# Patient Record
Sex: MALE | Race: WHITE | NOT HISPANIC OR LATINO | Employment: UNEMPLOYED | URBAN - NONMETROPOLITAN AREA
[De-identification: names, ages, dates, MRNs, and addresses within clinical notes are randomized per-mention and may not be internally consistent; named-entity substitution may affect disease eponyms.]

---

## 2017-02-22 ENCOUNTER — TRANSCRIBE ORDERS (OUTPATIENT)
Dept: ADMINISTRATIVE | Facility: HOSPITAL | Age: 57
End: 2017-02-22

## 2017-02-22 DIAGNOSIS — R91.8 ABNORMAL CXR WITH MULTIPLE NODULES: Primary | ICD-10-CM

## 2017-02-28 ENCOUNTER — HOSPITAL ENCOUNTER (OUTPATIENT)
Dept: CT IMAGING | Facility: HOSPITAL | Age: 57
Discharge: HOME | End: 2017-02-28
Admitting: GENERAL PRACTICE

## 2017-02-28 DIAGNOSIS — R91.8 ABNORMAL CXR WITH MULTIPLE NODULES: ICD-10-CM

## 2017-02-28 PROCEDURE — 0 IOPAMIDOL 61 % SOLUTION: Performed by: NURSE PRACTITIONER

## 2017-02-28 PROCEDURE — 71270 CT THORAX DX C-/C+: CPT

## 2017-02-28 PROCEDURE — 71270 CT THORAX DX C-/C+: CPT | Performed by: RADIOLOGY

## 2017-02-28 RX ADMIN — IOPAMIDOL 80 ML: 612 INJECTION, SOLUTION INTRAVENOUS at 08:50

## 2017-11-20 RX ORDER — SIMVASTATIN 40 MG
40 TABLET ORAL NIGHTLY
COMMUNITY
End: 2021-04-14

## 2017-11-20 RX ORDER — AMLODIPINE BESYLATE 10 MG/1
10 TABLET ORAL DAILY
COMMUNITY
End: 2021-04-14

## 2017-11-20 RX ORDER — LEVOTHYROXINE SODIUM 0.12 MG/1
125 TABLET ORAL DAILY
COMMUNITY

## 2017-11-20 RX ORDER — OXYCODONE HYDROCHLORIDE 15 MG/1
15 TABLET, FILM COATED, EXTENDED RELEASE ORAL EVERY 12 HOURS SCHEDULED
COMMUNITY

## 2017-11-20 RX ORDER — LISINOPRIL 40 MG/1
40 TABLET ORAL DAILY
COMMUNITY
End: 2021-04-14

## 2017-11-20 RX ORDER — GABAPENTIN 800 MG/1
800 TABLET ORAL 3 TIMES DAILY
COMMUNITY

## 2017-11-20 RX ORDER — TIZANIDINE 4 MG/1
4 TABLET ORAL NIGHTLY PRN
COMMUNITY
End: 2021-04-14

## 2017-11-20 RX ORDER — BUSPIRONE HYDROCHLORIDE 5 MG/1
5 TABLET ORAL 3 TIMES DAILY
COMMUNITY

## 2017-11-20 RX ORDER — LIDOCAINE 50 MG/G
OINTMENT TOPICAL
COMMUNITY
End: 2021-04-14

## 2017-11-20 RX ORDER — METOPROLOL SUCCINATE 25 MG/1
25 TABLET, EXTENDED RELEASE ORAL DAILY
COMMUNITY
End: 2021-04-14

## 2021-04-14 ENCOUNTER — CONSULT (OUTPATIENT)
Dept: GASTROENTEROLOGY | Facility: CLINIC | Age: 61
End: 2021-04-14

## 2021-04-14 VITALS
HEART RATE: 61 BPM | WEIGHT: 203.6 LBS | HEIGHT: 68 IN | TEMPERATURE: 97.2 F | BODY MASS INDEX: 30.86 KG/M2 | SYSTOLIC BLOOD PRESSURE: 144 MMHG | DIASTOLIC BLOOD PRESSURE: 90 MMHG

## 2021-04-14 DIAGNOSIS — R19.5 HEME POSITIVE STOOL: Primary | ICD-10-CM

## 2021-04-14 PROCEDURE — 99203 OFFICE O/P NEW LOW 30 MIN: CPT | Performed by: INTERNAL MEDICINE

## 2021-04-14 RX ORDER — ROSUVASTATIN CALCIUM 10 MG/1
TABLET, COATED ORAL
COMMUNITY
Start: 2021-03-12

## 2021-04-14 RX ORDER — APIXABAN 5 MG/1
TABLET, FILM COATED ORAL
COMMUNITY
Start: 2021-03-12

## 2021-04-14 NOTE — PROGRESS NOTES
"Subjective     Valente Etienne is a 60 y.o. male who presents to the office today as a consultation from Eugene Lopez MD for evaluation of Rectal Bleeding        History of Present Illness:  The patient presents for evaluation of heme positive stool.  He denies BRBPR or melena.  He states his stool is always brown.  No family history of colon cancer.  No constipation or diarrhea.  He generally has a bm once to 3 times a day.  No weight loss.  He takes Eliquis for \"a blood clot in my legs\".  He has never had an MI.  He does have hypothyroidism.  He is tells me that he is reluctant to undergo colonoscopy.  It has been offered to him on multiple occasions but he has declined.      Review of Systems:  Review of Systems   Constitutional: Negative for chills, fatigue and fever.   HENT: Negative for trouble swallowing.    Eyes: Negative.    Respiratory: Negative for chest tightness.    Cardiovascular: Negative for chest pain.   Gastrointestinal: Positive for anal bleeding and blood in stool. Negative for abdominal distention, abdominal pain, constipation, diarrhea, nausea and vomiting.   Endocrine: Negative.    Genitourinary: Negative for difficulty urinating.   Musculoskeletal: Positive for back pain. Negative for neck pain.   Skin: Negative.    Allergic/Immunologic: Negative.    Neurological: Negative for dizziness, light-headedness and headaches.   Hematological: Bruises/bleeds easily.   Psychiatric/Behavioral: Negative.        Past Medical History:  Past Medical History:   Diagnosis Date   • Chronic pain syndrome    • DVT (deep venous thrombosis) (CMS/HCC)    • Hyperlipidemia    • Hypertension    • Hypothyroidism        Past Surgical History:  History reviewed. No pertinent surgical history.    Family History:  Family History   Problem Relation Age of Onset   • Heart disease Mother    • Cancer Father        Social History:  Social History     Socioeconomic History   • Marital status:      Spouse name: Not on " "file   • Number of children: Not on file   • Years of education: Not on file   • Highest education level: Not on file   Tobacco Use   • Smoking status: Current Every Day Smoker     Packs/day: 1.00     Types: Cigarettes   • Smokeless tobacco: Never Used   Substance and Sexual Activity   • Alcohol use: No   • Drug use: No   • Sexual activity: Defer       Current Medication List:    Current Outpatient Medications:   •  busPIRone (BUSPAR) 5 MG tablet, Take 5 mg by mouth 3 (Three) Times a Day., Disp: , Rfl:   •  Eliquis 5 MG tablet tablet, , Disp: , Rfl:   •  gabapentin (NEURONTIN) 800 MG tablet, Take 800 mg by mouth 3 (Three) Times a Day., Disp: , Rfl:   •  levothyroxine (SYNTHROID, LEVOTHROID) 125 MCG tablet, Take 125 mcg by mouth Daily., Disp: , Rfl:   •  oxyCODONE ER (oxyCONTIN) 15 MG tablet extended-release 12 hour, Take 15 mg by mouth Every 12 (Twelve) Hours., Disp: , Rfl:   •  rosuvastatin (CRESTOR) 10 MG tablet, , Disp: , Rfl:     Allergies:   Patient has no known allergies.    Vitals:  /90 (BP Location: Left arm, Patient Position: Sitting, Cuff Size: Adult)   Pulse 61   Temp 97.2 °F (36.2 °C)   Ht 172.7 cm (68\")   Wt 92.4 kg (203 lb 9.6 oz)   BMI 30.96 kg/m²     Physical Exam:  Physical Exam  Constitutional:       Appearance: He is obese.   HENT:      Head: Normocephalic and atraumatic.      Nose: Nose normal. No congestion or rhinorrhea.   Eyes:      General: No scleral icterus.     Extraocular Movements: Extraocular movements intact.      Conjunctiva/sclera: Conjunctivae normal.      Pupils: Pupils are equal, round, and reactive to light.   Cardiovascular:      Rate and Rhythm: Normal rate and regular rhythm.      Pulses: Normal pulses.      Heart sounds: Normal heart sounds.   Pulmonary:      Effort: Pulmonary effort is normal.      Breath sounds: Normal breath sounds.   Abdominal:      General: Abdomen is flat. Bowel sounds are normal. There is no distension.      Palpations: Abdomen is soft. " There is no shifting dullness, fluid wave, hepatomegaly, splenomegaly, mass or pulsatile mass.      Tenderness: There is no abdominal tenderness. There is no guarding or rebound.      Hernia: No hernia is present.   Musculoskeletal:         General: No swelling (swelling L LE > R LE) or tenderness.      Cervical back: Normal range of motion and neck supple.   Skin:     General: Skin is warm and dry.      Coloration: Skin is not jaundiced.   Neurological:      General: No focal deficit present.      Mental Status: He is alert and oriented to person, place, and time.   Psychiatric:         Mood and Affect: Mood normal.         Behavior: Behavior normal.         Results Review:  Lab Results:   No visits with results within 1 Month(s) from this visit.   Latest known visit with results is:   No results found for any previous visit.       Assessment/Plan     Visit Diagnoses:    ICD-10-CM ICD-9-CM   1. Heme positive stool  R19.5 792.1       Plan:  I have had a long discussion with the patient concerning the benefit of colonoscopy in the setting of heme positive stool in respect to detecting colon cancer early and or preventing colon cancer in the future.  I have given him handouts on both colonoscopy preparation and the indications for colonoscopy.  He would like to share this information with his children and then make a decision as to whether or not he will proceed with colonoscopy.  I did explain the risk of not having a colonoscopy performed - primarily a preventable colon cancer.    * Surgery not found *      MEDICATION ISSUES:  Discussed medication options and treatment plan of prescribed medication as well as the risks, benefits, and side effects including potential falls, possible impaired driving and metabolic adversities among others. Patient is agreeable to call the office with any worsening of symptoms or onset of side effects. Patient is agreeable to call 911 or go to the nearest ER should he/she begin having  SI/HI.     MEDS ORDERED DURING VISIT:  No orders of the defined types were placed in this encounter.      No follow-ups on file.             This document has been electronically signed by Alda Muniz MD   April 14, 2021 09:59 EDT        Part of this note may be an electronic transcription/translation of spoken language to printed text using the Dragon Dictation System.

## 2021-10-14 ENCOUNTER — HOSPITAL ENCOUNTER (OUTPATIENT)
Dept: HOSPITAL 79 - EXRD | Age: 61
End: 2021-10-14
Attending: INTERNAL MEDICINE
Payer: MEDICARE

## 2021-10-14 DIAGNOSIS — I10: Primary | ICD-10-CM

## 2023-06-26 PROBLEM — Z86.718 HISTORY OF DVT (DEEP VEIN THROMBOSIS): Status: ACTIVE | Noted: 2023-06-26

## 2023-06-26 PROBLEM — R91.8 MULTIPLE PULMONARY NODULES: Status: ACTIVE | Noted: 2023-06-26

## 2023-06-26 PROBLEM — Z72.0 TOBACCO ABUSE: Status: ACTIVE | Noted: 2023-06-26

## 2023-06-26 PROBLEM — Z79.01 CHRONIC ANTICOAGULATION: Status: ACTIVE | Noted: 2023-06-26

## 2023-10-16 ENCOUNTER — HOSPITAL ENCOUNTER (OUTPATIENT)
Dept: CT IMAGING | Facility: HOSPITAL | Age: 63
Discharge: HOME | End: 2023-10-16
Admitting: INTERNAL MEDICINE
Payer: MEDICARE

## 2023-10-16 DIAGNOSIS — R97.20 ELEVATED PROSTATE SPECIFIC ANTIGEN (PSA): ICD-10-CM

## 2023-10-16 DIAGNOSIS — R91.8 MULTIPLE PULMONARY NODULES: ICD-10-CM

## 2023-10-16 PROCEDURE — 71250 CT THORAX DX C-: CPT

## 2023-10-16 PROCEDURE — 71250 CT THORAX DX C-: CPT | Performed by: RADIOLOGY

## 2023-10-17 ENCOUNTER — DOCUMENTATION (OUTPATIENT)
Dept: PULMONOLOGY | Facility: CLINIC | Age: 63
End: 2023-10-17
Payer: MEDICARE

## 2023-10-17 NOTE — PROGRESS NOTES
Patient underwent a repeat CT scan of the chest which review was reviewed on PACS.  Possibly some interval slight increase in size although slice selection may be playing a role.  We will discuss this further with the patient when he returns to the office for his visit on 11/6/2023

## 2023-11-06 DIAGNOSIS — R91.8 MULTIPLE PULMONARY NODULES: Primary | ICD-10-CM

## 2023-11-06 NOTE — PROGRESS NOTES
Patient underwent a repeat CT scan of the chest which I reviewed on PACS  He was not able to make his office visit today  I ended up talking to him on the phone    At this point, given the interval enlargement in several of the lesions, I recommended a biopsy despite the negative PET scan.  Our plan would be to do a navigational bronchoscopy with transbronchial biopsies as an outpatient in [place].  We will get that set up over the next couple of weeks.    Also have a call into the patient's daughter to discuss the results/plan as well

## 2023-11-14 DIAGNOSIS — R91.8 MULTIPLE PULMONARY NODULES: Primary | ICD-10-CM

## 2023-11-17 ENCOUNTER — PATIENT OUTREACH (OUTPATIENT)
Dept: PULMONOLOGY | Facility: CLINIC | Age: 63
End: 2023-11-17
Payer: MEDICARE

## 2023-11-17 DIAGNOSIS — Z01.818 PRE-PROCEDURAL EXAMINATION: ICD-10-CM

## 2023-11-17 DIAGNOSIS — Z01.810 PRE-PROCEDURAL CARDIOVASCULAR EXAMINATION: ICD-10-CM

## 2023-11-17 DIAGNOSIS — R91.8 MULTIPLE PULMONARY NODULES: Primary | ICD-10-CM

## 2023-11-17 DIAGNOSIS — Z79.01 CHRONIC ANTICOAGULATION: ICD-10-CM

## 2023-11-17 NOTE — PROGRESS NOTES
Nurse Navigator called to patient on this date; spoke with patient's daughter, Robyn.  Informed daughter of upcoming appointments scheduled for patient.  Instructed that pt will have a CT chest w/o contrast, and is scheduled for Wednesday, 12/6 at 12:30 pm at the [facility].  While patient is at [facility], he will also be a walk-in for blood work and an ECG.  Informed daughter that pt's robotic navigational bronchoscopy has been scheduled for Monday, 12/11 to arrive at 6:30 am (for proc start at 7:30 am) at the [facility] outpatient [facility] center.  Instructed her that pt will need a  the day of the procedure, as well as be NPO after midnight the evening prior.  Also, instructed to daughter that patient will need to hold his Eliquis beginning three days prior to the procedure.  All questions were answered to daughter's satisfaction.  NN contact information provided and encouraged for patient/daughter to call with any questions or concerns. Daughter verbalized understanding.

## 2023-12-06 ENCOUNTER — LAB (OUTPATIENT)
Dept: LAB | Facility: HOSPITAL | Age: 63
End: 2023-12-06
Payer: MEDICARE

## 2023-12-06 ENCOUNTER — HOSPITAL ENCOUNTER (OUTPATIENT)
Dept: CT IMAGING | Facility: HOSPITAL | Age: 63
Discharge: HOME | End: 2023-12-06
Payer: MEDICARE

## 2023-12-06 ENCOUNTER — HOSPITAL ENCOUNTER (OUTPATIENT)
Dept: RESPIRATORY THERAPY | Facility: HOSPITAL | Age: 63
Discharge: HOME | End: 2023-12-06
Payer: MEDICARE

## 2023-12-06 ENCOUNTER — PATIENT OUTREACH (OUTPATIENT)
Dept: PULMONOLOGY | Facility: CLINIC | Age: 63
End: 2023-12-06
Payer: MEDICARE

## 2023-12-06 DIAGNOSIS — Z01.810 PRE-PROCEDURAL CARDIOVASCULAR EXAMINATION: ICD-10-CM

## 2023-12-06 DIAGNOSIS — Z01.818 PRE-PROCEDURAL EXAMINATION: ICD-10-CM

## 2023-12-06 DIAGNOSIS — R91.8 MULTIPLE PULMONARY NODULES: ICD-10-CM

## 2023-12-06 DIAGNOSIS — Z79.01 CHRONIC ANTICOAGULATION: ICD-10-CM

## 2023-12-06 LAB
APTT PPP: 31.3 SECONDS (ref 26.5–34.5)
INR PPP: 0.97 (ref 0.9–1.1)
PROTHROMBIN TIME: 13.4 SECONDS (ref 12.1–14.7)
QT INTERVAL: 454 MS
QTC INTERVAL: 464 MS

## 2023-12-06 PROCEDURE — 93005 ELECTROCARDIOGRAM TRACING: CPT | Performed by: INTERNAL MEDICINE

## 2023-12-06 PROCEDURE — 71250 CT THORAX DX C-: CPT

## 2023-12-06 PROCEDURE — 85610 PROTHROMBIN TIME: CPT

## 2023-12-06 PROCEDURE — 36415 COLL VENOUS BLD VENIPUNCTURE: CPT

## 2023-12-06 PROCEDURE — 85025 COMPLETE CBC W/AUTO DIFF WBC: CPT

## 2023-12-06 PROCEDURE — 80053 COMPREHEN METABOLIC PANEL: CPT

## 2023-12-06 PROCEDURE — 85730 THROMBOPLASTIN TIME PARTIAL: CPT

## 2023-12-07 LAB
ALBUMIN SERPL-MCNC: 4.4 G/DL (ref 3.5–5.2)
ALBUMIN/GLOB SERPL: 1.9 G/DL
ALP SERPL-CCNC: 80 U/L (ref 39–117)
ALT SERPL W P-5'-P-CCNC: 14 U/L (ref 1–41)
ANION GAP SERPL CALCULATED.3IONS-SCNC: 10 MMOL/L (ref 5–15)
AST SERPL-CCNC: 14 U/L (ref 1–40)
BASOPHILS # BLD AUTO: 0.04 10*3/MM3 (ref 0–0.2)
BASOPHILS NFR BLD AUTO: 0.6 % (ref 0–1.5)
BILIRUB SERPL-MCNC: 1 MG/DL (ref 0–1.2)
BUN SERPL-MCNC: 16 MG/DL (ref 8–23)
BUN/CREAT SERPL: 11.9 (ref 7–25)
CALCIUM SPEC-SCNC: 9 MG/DL (ref 8.6–10.5)
CHLORIDE SERPL-SCNC: 106 MMOL/L (ref 98–107)
CO2 SERPL-SCNC: 23 MMOL/L (ref 22–29)
CREAT SERPL-MCNC: 1.34 MG/DL (ref 0.76–1.27)
DEPRECATED RDW RBC AUTO: 46.4 FL (ref 37–54)
EGFRCR SERPLBLD CKD-EPI 2021: 59.5 ML/MIN/1.73
EOSINOPHIL # BLD AUTO: 0.16 10*3/MM3 (ref 0–0.4)
EOSINOPHIL NFR BLD AUTO: 2.3 % (ref 0.3–6.2)
ERYTHROCYTE [DISTWIDTH] IN BLOOD BY AUTOMATED COUNT: 14.6 % (ref 12.3–15.4)
GLOBULIN UR ELPH-MCNC: 2.3 GM/DL
GLUCOSE SERPL-MCNC: 84 MG/DL (ref 65–99)
HCT VFR BLD AUTO: 49 % (ref 37.5–51)
HGB BLD-MCNC: 16.4 G/DL (ref 13–17.7)
IMM GRANULOCYTES # BLD AUTO: 0.03 10*3/MM3 (ref 0–0.05)
IMM GRANULOCYTES NFR BLD AUTO: 0.4 % (ref 0–0.5)
LYMPHOCYTES # BLD AUTO: 1.86 10*3/MM3 (ref 0.7–3.1)
LYMPHOCYTES NFR BLD AUTO: 27.2 % (ref 19.6–45.3)
MCH RBC QN AUTO: 29.5 PG (ref 26.6–33)
MCHC RBC AUTO-ENTMCNC: 33.5 G/DL (ref 31.5–35.7)
MCV RBC AUTO: 88.3 FL (ref 79–97)
MONOCYTES # BLD AUTO: 0.71 10*3/MM3 (ref 0.1–0.9)
MONOCYTES NFR BLD AUTO: 10.4 % (ref 5–12)
NEUTROPHILS NFR BLD AUTO: 4.04 10*3/MM3 (ref 1.7–7)
NEUTROPHILS NFR BLD AUTO: 59.1 % (ref 42.7–76)
NRBC BLD AUTO-RTO: 0 /100 WBC (ref 0–0.2)
PLATELET # BLD AUTO: 192 10*3/MM3 (ref 140–450)
PMV BLD AUTO: 10.2 FL (ref 6–12)
POTASSIUM SERPL-SCNC: 4.2 MMOL/L (ref 3.5–5.2)
PROT SERPL-MCNC: 6.7 G/DL (ref 6–8.5)
RBC # BLD AUTO: 5.55 10*6/MM3 (ref 4.14–5.8)
SODIUM SERPL-SCNC: 139 MMOL/L (ref 136–145)
WBC NRBC COR # BLD AUTO: 6.84 10*3/MM3 (ref 3.4–10.8)

## 2023-12-11 ENCOUNTER — APPOINTMENT (OUTPATIENT)
Dept: GENERAL RADIOLOGY | Facility: HOSPITAL | Age: 63
End: 2023-12-11
Payer: MEDICARE

## 2023-12-11 ENCOUNTER — ANESTHESIA EVENT (OUTPATIENT)
Dept: PERIOP | Facility: HOSPITAL | Age: 63
End: 2023-12-11
Payer: MEDICARE

## 2023-12-11 ENCOUNTER — HOSPITAL ENCOUNTER (OUTPATIENT)
Facility: HOSPITAL | Age: 63
Setting detail: HOSPITAL OUTPATIENT SURGERY
Discharge: HOME | End: 2023-12-11
Attending: INTERNAL MEDICINE | Admitting: INTERNAL MEDICINE
Payer: MEDICARE

## 2023-12-11 ENCOUNTER — ANESTHESIA (OUTPATIENT)
Dept: PERIOP | Facility: HOSPITAL | Age: 63
End: 2023-12-11
Payer: MEDICARE

## 2023-12-11 VITALS
TEMPERATURE: 97.4 F | BODY MASS INDEX: 31.39 KG/M2 | OXYGEN SATURATION: 93 % | DIASTOLIC BLOOD PRESSURE: 67 MMHG | HEART RATE: 64 BPM | WEIGHT: 200 LBS | RESPIRATION RATE: 20 BRPM | SYSTOLIC BLOOD PRESSURE: 131 MMHG | HEIGHT: 67 IN

## 2023-12-11 DIAGNOSIS — R91.8 MULTIPLE PULMONARY NODULES: ICD-10-CM

## 2023-12-11 PROCEDURE — 76000 FLUOROSCOPY <1 HR PHYS/QHP: CPT

## 2023-12-11 PROCEDURE — 25010000002 PROPOFOL 10 MG/ML EMULSION: Performed by: NURSE ANESTHETIST, CERTIFIED REGISTERED

## 2023-12-11 PROCEDURE — 87206 SMEAR FLUORESCENT/ACID STAI: CPT | Performed by: INTERNAL MEDICINE

## 2023-12-11 PROCEDURE — 71045 X-RAY EXAM CHEST 1 VIEW: CPT

## 2023-12-11 PROCEDURE — 87071 CULTURE AEROBIC QUANT OTHER: CPT | Performed by: INTERNAL MEDICINE

## 2023-12-11 PROCEDURE — 87205 SMEAR GRAM STAIN: CPT | Performed by: INTERNAL MEDICINE

## 2023-12-11 PROCEDURE — 25810000003 LACTATED RINGERS PER 1000 ML: Performed by: ANESTHESIOLOGY

## 2023-12-11 PROCEDURE — 25010000002 FENTANYL CITRATE (PF) 50 MCG/ML SOLUTION: Performed by: NURSE ANESTHETIST, CERTIFIED REGISTERED

## 2023-12-11 PROCEDURE — 87116 MYCOBACTERIA CULTURE: CPT | Performed by: INTERNAL MEDICINE

## 2023-12-11 PROCEDURE — 87102 FUNGUS ISOLATION CULTURE: CPT | Performed by: INTERNAL MEDICINE

## 2023-12-11 PROCEDURE — 25010000002 DEXAMETHASONE PER 1 MG: Performed by: NURSE ANESTHETIST, CERTIFIED REGISTERED

## 2023-12-11 PROCEDURE — 25010000002 ONDANSETRON PER 1 MG: Performed by: NURSE ANESTHETIST, CERTIFIED REGISTERED

## 2023-12-11 RX ORDER — LOSARTAN POTASSIUM 25 MG/1
25 TABLET ORAL DAILY
COMMUNITY
Start: 2023-10-05

## 2023-12-11 RX ORDER — ONDANSETRON 2 MG/ML
4 INJECTION INTRAMUSCULAR; INTRAVENOUS AS NEEDED
Status: DISCONTINUED | OUTPATIENT
Start: 2023-12-11 | End: 2023-12-11 | Stop reason: HOSPADM

## 2023-12-11 RX ORDER — SODIUM CHLORIDE 0.9 % (FLUSH) 0.9 %
10 SYRINGE (ML) INJECTION AS NEEDED
Status: DISCONTINUED | OUTPATIENT
Start: 2023-12-11 | End: 2023-12-11 | Stop reason: HOSPADM

## 2023-12-11 RX ORDER — ROCURONIUM BROMIDE 10 MG/ML
INJECTION, SOLUTION INTRAVENOUS AS NEEDED
Status: DISCONTINUED | OUTPATIENT
Start: 2023-12-11 | End: 2023-12-11 | Stop reason: SURG

## 2023-12-11 RX ORDER — IPRATROPIUM BROMIDE AND ALBUTEROL SULFATE 2.5; .5 MG/3ML; MG/3ML
3 SOLUTION RESPIRATORY (INHALATION) ONCE AS NEEDED
Status: DISCONTINUED | OUTPATIENT
Start: 2023-12-11 | End: 2023-12-11 | Stop reason: HOSPADM

## 2023-12-11 RX ORDER — SODIUM CHLORIDE 9 MG/ML
40 INJECTION, SOLUTION INTRAVENOUS AS NEEDED
Status: DISCONTINUED | OUTPATIENT
Start: 2023-12-11 | End: 2023-12-11 | Stop reason: HOSPADM

## 2023-12-11 RX ORDER — MIDAZOLAM HYDROCHLORIDE 1 MG/ML
1 INJECTION INTRAMUSCULAR; INTRAVENOUS
Status: DISCONTINUED | OUTPATIENT
Start: 2023-12-11 | End: 2023-12-11 | Stop reason: HOSPADM

## 2023-12-11 RX ORDER — ONDANSETRON 2 MG/ML
INJECTION INTRAMUSCULAR; INTRAVENOUS AS NEEDED
Status: DISCONTINUED | OUTPATIENT
Start: 2023-12-11 | End: 2023-12-11 | Stop reason: SURG

## 2023-12-11 RX ORDER — MAGNESIUM HYDROXIDE 1200 MG/15ML
LIQUID ORAL AS NEEDED
Status: DISCONTINUED | OUTPATIENT
Start: 2023-12-11 | End: 2023-12-11 | Stop reason: HOSPADM

## 2023-12-11 RX ORDER — SODIUM CHLORIDE, SODIUM LACTATE, POTASSIUM CHLORIDE, CALCIUM CHLORIDE 600; 310; 30; 20 MG/100ML; MG/100ML; MG/100ML; MG/100ML
125 INJECTION, SOLUTION INTRAVENOUS ONCE
Status: COMPLETED | OUTPATIENT
Start: 2023-12-11 | End: 2023-12-11

## 2023-12-11 RX ORDER — OXYCODONE HYDROCHLORIDE AND ACETAMINOPHEN 5; 325 MG/1; MG/1
1 TABLET ORAL ONCE AS NEEDED
Status: DISCONTINUED | OUTPATIENT
Start: 2023-12-11 | End: 2023-12-11 | Stop reason: HOSPADM

## 2023-12-11 RX ORDER — LIDOCAINE HYDROCHLORIDE 20 MG/ML
INJECTION, SOLUTION EPIDURAL; INFILTRATION; INTRACAUDAL; PERINEURAL AS NEEDED
Status: DISCONTINUED | OUTPATIENT
Start: 2023-12-11 | End: 2023-12-11 | Stop reason: SURG

## 2023-12-11 RX ORDER — FENTANYL CITRATE 50 UG/ML
INJECTION, SOLUTION INTRAMUSCULAR; INTRAVENOUS AS NEEDED
Status: DISCONTINUED | OUTPATIENT
Start: 2023-12-11 | End: 2023-12-11 | Stop reason: SURG

## 2023-12-11 RX ORDER — FAMOTIDINE 10 MG/ML
INJECTION, SOLUTION INTRAVENOUS AS NEEDED
Status: DISCONTINUED | OUTPATIENT
Start: 2023-12-11 | End: 2023-12-11 | Stop reason: SURG

## 2023-12-11 RX ORDER — FENTANYL CITRATE 50 UG/ML
50 INJECTION, SOLUTION INTRAMUSCULAR; INTRAVENOUS
Status: DISCONTINUED | OUTPATIENT
Start: 2023-12-11 | End: 2023-12-11 | Stop reason: HOSPADM

## 2023-12-11 RX ORDER — PROPOFOL 10 MG/ML
VIAL (ML) INTRAVENOUS AS NEEDED
Status: DISCONTINUED | OUTPATIENT
Start: 2023-12-11 | End: 2023-12-11 | Stop reason: SURG

## 2023-12-11 RX ORDER — DEXAMETHASONE SODIUM PHOSPHATE 4 MG/ML
INJECTION, SOLUTION INTRA-ARTICULAR; INTRALESIONAL; INTRAMUSCULAR; INTRAVENOUS; SOFT TISSUE AS NEEDED
Status: DISCONTINUED | OUTPATIENT
Start: 2023-12-11 | End: 2023-12-11 | Stop reason: SURG

## 2023-12-11 RX ORDER — SODIUM CHLORIDE, SODIUM LACTATE, POTASSIUM CHLORIDE, CALCIUM CHLORIDE 600; 310; 30; 20 MG/100ML; MG/100ML; MG/100ML; MG/100ML
100 INJECTION, SOLUTION INTRAVENOUS ONCE AS NEEDED
Status: DISCONTINUED | OUTPATIENT
Start: 2023-12-11 | End: 2023-12-11 | Stop reason: HOSPADM

## 2023-12-11 RX ORDER — SODIUM CHLORIDE 0.9 % (FLUSH) 0.9 %
10 SYRINGE (ML) INJECTION EVERY 12 HOURS SCHEDULED
Status: DISCONTINUED | OUTPATIENT
Start: 2023-12-11 | End: 2023-12-11 | Stop reason: HOSPADM

## 2023-12-11 RX ADMIN — SODIUM CHLORIDE, POTASSIUM CHLORIDE, SODIUM LACTATE AND CALCIUM CHLORIDE: 600; 310; 30; 20 INJECTION, SOLUTION INTRAVENOUS at 07:34

## 2023-12-11 RX ADMIN — FAMOTIDINE 20 MG: 10 INJECTION, SOLUTION INTRAVENOUS at 07:34

## 2023-12-11 RX ADMIN — ONDANSETRON 4 MG: 2 INJECTION INTRAMUSCULAR; INTRAVENOUS at 07:34

## 2023-12-11 RX ADMIN — ROCURONIUM BROMIDE 30 MG: 10 SOLUTION INTRAVENOUS at 07:40

## 2023-12-11 RX ADMIN — LIDOCAINE HYDROCHLORIDE 6 MG: 20 INJECTION, SOLUTION EPIDURAL; INFILTRATION; INTRACAUDAL; PERINEURAL at 07:40

## 2023-12-11 RX ADMIN — FENTANYL CITRATE 50 MCG: 50 INJECTION INTRAMUSCULAR; INTRAVENOUS at 07:34

## 2023-12-11 RX ADMIN — PROPOFOL 150 MG: 10 INJECTION, EMULSION INTRAVENOUS at 07:40

## 2023-12-11 RX ADMIN — FENTANYL CITRATE 50 MCG: 50 INJECTION INTRAMUSCULAR; INTRAVENOUS at 07:40

## 2023-12-11 RX ADMIN — DEXAMETHASONE SODIUM PHOSPHATE 4 MG: 4 INJECTION, SOLUTION INTRA-ARTICULAR; INTRALESIONAL; INTRAMUSCULAR; INTRAVENOUS; SOFT TISSUE at 07:40

## 2023-12-11 NOTE — ANESTHESIA PREPROCEDURE EVALUATION
Anesthesia Evaluation     Patient summary reviewed and Nursing notes reviewed   no history of anesthetic complications:   NPO Solid Status: > 8 hours  NPO Liquid Status: > 8 hours           Airway   Mallampati: II  TM distance: >3 FB  Neck ROM: full  No difficulty expected  Dental    (+) poor dentition    Pulmonary - normal exam    breath sounds clear to auscultation  (+) a smoker Current, Abstained day of surgery, cigarettes,shortness of breath  Cardiovascular - normal exam    ECG reviewed  Rhythm: regular  Rate: normal    (+) hypertension, WRIGHT, DVT resolved, hyperlipidemia      Neuro/Psych- negative ROS  GI/Hepatic/Renal/Endo    (+) obesity, thyroid problem hypothyroidism    Musculoskeletal     Abdominal  - normal exam   Substance History - negative use     OB/GYN negative ob/gyn ROS         Other   arthritis,                 Anesthesia Plan    ASA 3     general     intravenous induction     Anesthetic plan, risks, benefits, and alternatives have been provided, discussed and informed consent has been obtained with: patient.  Pre-procedure education provided  Plan discussed with CRNA.    CODE STATUS:

## 2023-12-11 NOTE — H&P
PULMONARY  NOTE     Chief Complaint      Multiple pulmonary nodules, tobacco abuse, history of DVT, chronic anticoagulation     History of Present Illness      62-year-old male referred for an abnormal CT scan of the chest     He underwent a CT scan of the chest with contrast in [place]  That result as noted below     He has a past history of pulmonary nodules although I do not have any prior imaging studies more recent than 2017 for my review  The patient does not think he has had a CT scan of the chest between 2017 and 2023     He has no regular cough or sputum production  Has had no hemoptysis     He has dyspnea on exertion  He would be able to go up a flight of steps, however, without having to stop due to shortness of breath  He is on no respiratory medications     He is gained weight     He has no history of prostate disease or cancer  He has never had a colonoscopy     He is chronically anticoagulated on Eliquis for a DVT         Patient Active Problem List   Diagnosis    Tobacco abuse    History of DVT (deep vein thrombosis)    Chronic anticoagulation (Eliquis)    Multiple pulmonary nodules (Mx 20mm) - New from 2017      No Known Allergies     Current Outpatient Medications:     busPIRone (BUSPAR) 5 MG tablet, Take 1 tablet by mouth 3 (Three) Times a Day., Disp: , Rfl:     Eliquis 5 MG tablet tablet, , Disp: , Rfl:     gabapentin (NEURONTIN) 600 MG tablet, Take 1 tablet by mouth 3 (Three) Times a Day., Disp: , Rfl:     levothyroxine (SYNTHROID, LEVOTHROID) 125 MCG tablet, Take 1 tablet by mouth Daily., Disp: , Rfl:     oxyCODONE (ROXICODONE) 15 MG immediate release tablet, Take 1 tablet by mouth Every 6 (Six) Hours., Disp: , Rfl:     rosuvastatin (CRESTOR) 10 MG tablet, , Disp: , Rfl:     sildenafil (VIAGRA) 100 MG tablet, Take 1 tablet by mouth Daily., Disp: , Rfl:     tiZANidine (ZANAFLEX) 4 MG tablet, Take 1 tablet by mouth Every 12 (Twelve) Hours., Disp: , Rfl:     gabapentin (NEURONTIN) 800 MG  "tablet, Take 1 tablet by mouth 3 (Three) Times a Day., Disp: , Rfl:     oxyCODONE ER (oxyCONTIN) 15 MG tablet extended-release 12 hour, Take 1 tablet by mouth Every 12 (Twelve) Hours., Disp: , Rfl:   MEDICATION LIST AND ALLERGIES REVIEWED.           Family History   Problem Relation Age of Onset    Heart disease Mother      Cancer Father        Social History            Tobacco Use    Smoking status: Every Day       Packs/day: 1.00       Types: Cigarettes       Passive exposure: Current    Smokeless tobacco: Never   Vaping Use    Vaping Use: Never used   Substance Use Topics    Alcohol use: No    Drug use: No      Social History          Social History Narrative     Single     Ongoing tobacco abuse of at least a pack cigarettes per day     Has worked in a [employer]      FAMILY AND SOCIAL HISTORY REVIEWED.     Review of Systems  IF PRESENT REFER TO SCANNED ROS SHEET FROM SAME DATE  OTHERWISE ROS OBTAINED AND NON-CONTRIBUTORY OVER HPI.     /62   Pulse 66   Temp 98 °F (36.7 °C) (Temporal)   Ht 170.2 cm (67\")   Wt 99.3 kg (219 lb)   SpO2 98%   BMI 34.30 kg/m²   Physical Exam  Vitals and nursing note reviewed.   Constitutional:       General: He is not in acute distress.     Appearance: He is well-developed. He is not diaphoretic.   HENT:      Head: Normocephalic and atraumatic.   Neck:      Thyroid: No thyromegaly.   Cardiovascular:      Rate and Rhythm: Normal rate and regular rhythm.      Heart sounds: Normal heart sounds. No murmur heard.  Pulmonary:      Effort: Pulmonary effort is normal.      Breath sounds: Normal breath sounds. No stridor.   Lymphadenopathy:      Cervical: No cervical adenopathy.      Upper Body:      Right upper body: No supraclavicular or epitrochlear adenopathy.      Left upper body: No supraclavicular or epitrochlear adenopathy.   Skin:     General: Skin is warm and dry.   Neurological:      Mental Status: He is alert and oriented to person, place, and time.   Psychiatric:         " Behavior: Behavior normal.         Results      CT scan of the chest from [place] from 5/23/2023 reviewed  Multiple parenchymal nodules of varying sizes up to 23 mm  Not present on a prior CT scan of the chest done in the [facility] system in 2017     There is no immunization history on file for this patient.  Problem List      Visit Diagnosis       ICD-10-CM ICD-9-CM   1. Multiple pulmonary nodules (Mx 20mm) - New from 2017  R91.8 793.19   2. Tobacco abuse  Z72.0 305.1   3. History of DVT (deep vein thrombosis)  Z86.718 V12.51   4. Chronic anticoagulation (Eliquis)  Z79.01 V58.61            Discussion      We reviewed his chest imaging  He has multiple parenchymal densities new from 2017  The differential is wide but certainly worrisome for metastatic disease  This radiographic picture is unlikely to be primary bronchogenic carcinoma  He has not had a colonoscopy in the past  Other nonmalignant etiologies such as GPA, rheumatoid nodules, fungal disease are a possibility     I recommended lab work including fungal serologies, CEA, PSA, and ANCA     Also, a PET CT scan     Based on that we will decide what the best approach is as I suspect he is going to need a biopsy  We discussed biopsy options in the office today including surgical lung biopsy, CT FNA, and bronchoscopy    PULMONARY  NOTE     Chief Complaint      Multiple pulmonary nodules, tobacco abuse, history of DVT, chronic anticoagulation     History of Present Illness      62-year-old male referred for an abnormal CT scan of the chest     He underwent a CT scan of the chest with contrast in [place]  That result as noted below     He has a past history of pulmonary nodules although I do not have any prior imaging studies more recent than 2017 for my review  The patient does not think he has had a CT scan of the chest between 2017 and 2023     He has no regular cough or sputum production  Has had no hemoptysis     He has dyspnea on exertion  He would be  able to go up a flight of steps, however, without having to stop due to shortness of breath  He is on no respiratory medications     He is gained weight     He has no history of prostate disease or cancer  He has never had a colonoscopy     He is chronically anticoagulated on Eliquis for a DVT         Patient Active Problem List   Diagnosis    Tobacco abuse    History of DVT (deep vein thrombosis)    Chronic anticoagulation (Eliquis)    Multiple pulmonary nodules (Mx 20mm) - New from 2017      No Known Allergies     Current Outpatient Medications:     busPIRone (BUSPAR) 5 MG tablet, Take 1 tablet by mouth 3 (Three) Times a Day., Disp: , Rfl:     Eliquis 5 MG tablet tablet, , Disp: , Rfl:     gabapentin (NEURONTIN) 600 MG tablet, Take 1 tablet by mouth 3 (Three) Times a Day., Disp: , Rfl:     levothyroxine (SYNTHROID, LEVOTHROID) 125 MCG tablet, Take 1 tablet by mouth Daily., Disp: , Rfl:     oxyCODONE (ROXICODONE) 15 MG immediate release tablet, Take 1 tablet by mouth Every 6 (Six) Hours., Disp: , Rfl:     rosuvastatin (CRESTOR) 10 MG tablet, , Disp: , Rfl:     sildenafil (VIAGRA) 100 MG tablet, Take 1 tablet by mouth Daily., Disp: , Rfl:     tiZANidine (ZANAFLEX) 4 MG tablet, Take 1 tablet by mouth Every 12 (Twelve) Hours., Disp: , Rfl:     gabapentin (NEURONTIN) 800 MG tablet, Take 1 tablet by mouth 3 (Three) Times a Day., Disp: , Rfl:     oxyCODONE ER (oxyCONTIN) 15 MG tablet extended-release 12 hour, Take 1 tablet by mouth Every 12 (Twelve) Hours., Disp: , Rfl:   MEDICATION LIST AND ALLERGIES REVIEWED.           Family History   Problem Relation Age of Onset    Heart disease Mother      Cancer Father        Social History            Tobacco Use    Smoking status: Every Day       Packs/day: 1.00       Types: Cigarettes       Passive exposure: Current    Smokeless tobacco: Never   Vaping Use    Vaping Use: Never used   Substance Use Topics    Alcohol use: No    Drug use: No      Social History          Social  "History Narrative     Single     Ongoing tobacco abuse of at least a pack cigarettes per day     Has worked in a [employer]      FAMILY AND SOCIAL HISTORY REVIEWED.     Review of Systems  IF PRESENT REFER TO SCANNED ROS SHEET FROM SAME DATE  OTHERWISE ROS OBTAINED AND NON-CONTRIBUTORY OVER HPI.     /62   Pulse 66   Temp 98 °F (36.7 °C) (Temporal)   Ht 170.2 cm (67\")   Wt 99.3 kg (219 lb)   SpO2 98%   BMI 34.30 kg/m²   Physical Exam  Vitals and nursing note reviewed.   Constitutional:       General: He is not in acute distress.     Appearance: He is well-developed. He is not diaphoretic.   HENT:      Head: Normocephalic and atraumatic.   Neck:      Thyroid: No thyromegaly.   Cardiovascular:      Rate and Rhythm: Normal rate and regular rhythm.      Heart sounds: Normal heart sounds. No murmur heard.  Pulmonary:      Effort: Pulmonary effort is normal.      Breath sounds: Normal breath sounds. No stridor.   Lymphadenopathy:      Cervical: No cervical adenopathy.      Upper Body:      Right upper body: No supraclavicular or epitrochlear adenopathy.      Left upper body: No supraclavicular or epitrochlear adenopathy.   Skin:     General: Skin is warm and dry.   Neurological:      Mental Status: He is alert and oriented to person, place, and time.   Psychiatric:         Behavior: Behavior normal.         Results      CT scan of the chest from [place] from 5/23/2023 reviewed  Multiple parenchymal nodules of varying sizes up to 23 mm  Not present on a prior CT scan of the chest done in the [facility] system in 2017    Also, PET CT scan and follow-up CT scan of the chest reviewed on PACS     There is no immunization history on file for this patient.  Problem List      Visit Diagnosis       ICD-10-CM ICD-9-CM   1. Multiple pulmonary nodules (Mx 20mm) - New from 2017  R91.8 793.19   2. Tobacco abuse  Z72.0 305.1   3. History of DVT (deep vein thrombosis)  Z86.718 V12.51   4. Chronic anticoagulation (Eliquis)  Z79.01 " V58.61            Discussion      We reviewed his chest imaging  He has multiple parenchymal densities new from 2017  The differential is wide but certainly worrisome for metastatic disease  This radiographic picture is unlikely to be primary bronchogenic carcinoma  He has not had a colonoscopy in the past  Other nonmalignant etiologies such as GPA, rheumatoid nodules, fungal disease are a possibility     I recommended lab work including fungal serologies, CEA, PSA, and ANCA     Also, a PET CT scan     Based on that we will decide what the best approach is as I suspect he is going to need a biopsy  We discussed biopsy options in the office today including surgical lung biopsy, CT FNA, and bronchoscopy    The patient underwent a PET CT scan which revealed no abnormal hypermetabolic activity, however, on subsequent chest imaging there has been interval enlargement in parenchymal densities therefore our plan is to pursue a bronchoscopy under navigational assistance today.  We discussed the risks including the risk of pneumothorax that would require hospitalization and could require chest tube placement and a prolonged hospital stay    Moderate level of Medical Decision Making complexity based on 1 undiagnosed new problem or 2 stable chronic conditions, independent interpretation of tests, and/or prescription drug management

## 2023-12-11 NOTE — ANESTHESIA POSTPROCEDURE EVALUATION
Patient: Valente Etienne    Procedure Summary       Date: 12/11/23 Room / Location:  [facility]  /  [facility]    Anesthesia Start: 0734 Anesthesia Stop: 0855    Procedure: BRONCHOSCOPY WITH ION ROBOT Diagnosis:       Multiple pulmonary nodules      (Multiple pulmonary nodules [R91.8])    Surgeons: Raymond Salazar MD Provider: Jose Louise DO    Anesthesia Type: general ASA Status: 3            Anesthesia Type: general    Vitals  Vitals Value Taken Time   /67 12/11/23 0940   Temp 97.4 °F (36.3 °C) 12/11/23 0855   Pulse 60 12/11/23 0942   Resp 20 12/11/23 0855   SpO2 96 % 12/11/23 0941   Vitals shown include unfiled device data.        Post Anesthesia Care and Evaluation    Patient location during evaluation: PHASE II  Patient participation: complete - patient participated  Level of consciousness: awake and alert  Pain score: 0  Pain management: adequate    Airway patency: patent  Anesthetic complications: No anesthetic complications  PONV Status: controlled  Cardiovascular status: acceptable  Respiratory status: acceptable and room air  Hydration status: euvolemic  No anesthesia care post op

## 2023-12-11 NOTE — OP NOTE
Bronchoscopy Procedure Note    Pre-op Diagnosis: Enlarging pulmonary nodules    Post-op Diagnosis: Same    Surgeon: Raymond Salazar MD    Anesthesia: General    Operation: Flexible fiberoptic bronchoscopy    Findings: No endobronchial lesions    Specimen(s): Transbronchial biopsy, transbronchial needle aspiration, brush left upper lobe lesion 1, transbronchial biopsy, transbronchial needle aspiration, brush left upper lobe lesion 2, transbronchial biopsy, transbronchial needle aspiration, brush, BAL right upper lobe    Estimated Blood Loss: Minimal/None    Complications: No immediate.  Chest x-ray pending    Indications and History:  Valente Etienne is a 63 y.o. male  with several pulmonary nodules that did not exhibit abnormal hypermetabolic activity but have shown some interval enlargement over serial chest imaging.  The risks, benefits, complications, treatment options and expected outcomes were discussed with the patient.  The possibilities of reaction to medication, pulmonary aspiration, perforation of a viscus, bleeding, failure to diagnose a condition and creating a complication requiring transfusion or operation were discussed with the patient who freely signed the consent.      Description of Procedure:  The patient was seen in the Holding Room and an immediate patient assessment was done prior to the administration of moderate and/or deep conscious sedation.  The patient was taken to the Endoscopy Suite, identified as Valente Etienne  and the procedure verified as Flexible Fiberoptic Bronchoscopy.  A Time Out was held and the above information confirmed.    After the induction of general anesthesia the patient was intubated with an 8.0 endotracheal tube    The bronchoscope was introduced via the endotracheal tube which confirmed good position of the distal one third of the trachea    The scope is advanced into the left mainstem bronchus.  The left upper lobe, lingula, left lower lobe, superior segment  of the left lower lobe revealed mild chronic bronchitic changes and clear secretions but no discrete endobronchial lesions.    The scope was advanced into the right mainstem bronchus.  The right upper lobe, bronchus intermedius, right middle lobe, right lower lobe, and superior segment of the right lower lobe revealed mild chronic bronchitic changes and clear secretions but no discrete endobronchial lesions.  The airways were suctioned clear.    The regular scope was removed and the navigational catheter and camera were introduced.  Registration was performed using the navigational software.  Then using a previously plotted course the catheter and camera were advanced out to within about 10 mm of the left upper lobe lesion, #1.  Fluoroscopy was used under my direct supervision without a radiologist present for obtaining specimens.  Radial ultrasound was utilized to help obtain the best, concentric signal.  Multiple passes were taken with a 19-gauge needle, multiple endobronchial biopsy specimens were obtained, a 7 mm cytology brushing specimen was obtained.    The catheter was removed, flushed, and reintroduced.  It was then navigated out to the second left upper lobe lesion.  Again fluoroscopy and radial ultrasound were utilized.  Multiple specimens were obtained with a 19-gauge needle, transbronchial biopsies, and brushing specimen.    The catheter was removed, flushed, and reintroduced.  It was navigated out to the right upper lobe lesion.  Again fluoroscopy and radial ultrasound were utilized.  Multiple needle aspirations, transbronchial biopsies, and a brushing specimen were obtained.  A BAL was obtained with 60 mL instilled and 30 mL returned.    The navigational equipment was removed the regular scope was reintroduced.  The airways were suctioned clear and at the end of the procedure there was no evidence of active bleeding and no significant residual blood.  The scope was withdrawn without  difficulty.    Fluoroscopy    Fluoroscopy was used on my direct supervision without a radiologist present for obtaining transbronchial biopsy and brushing specimens.  Fluoroscopy was used at the end of the procedure to exclude a pneumothorax.  None was identified.  Less than 6 minutes of fluoroscopy time was used in total    The Patient was taken to the Endoscopy Recovery area in satisfactory condition.    Attestation: I performed the procedure    Raymond Salazar MD, [facility]

## 2023-12-11 NOTE — ANESTHESIA PROCEDURE NOTES
Airway  Urgency: elective    Date/Time: 12/11/2023 7:40 AM  Airway not difficult    General Information and Staff    Patient location during procedure: OR  CRNA/CAA: Sherrie Tidwell APRN    Indications and Patient Condition  Indications for airway management: airway protection    Preoxygenated: yes  MILS not maintained throughout  Mask difficulty assessment: 0 - not attempted    Final Airway Details  Final airway type: endotracheal airway      Successful airway: ETT  Cuffed: yes   Successful intubation technique: direct laryngoscopy  Facilitating devices/methods: cricoid pressure and intubating stylet  Endotracheal tube insertion site: oral  Blade: Felipe  Blade size: 4  ETT size (mm): 8.0  Cormack-Lehane Classification: grade I - full view of glottis  Placement verified by: chest auscultation, capnometry and palpation of cuff   Measured from: lips  ETT/EBT  to lips (cm): 22  Number of attempts at approach: 1  Assessment: lips, teeth, and gum same as pre-op and atraumatic intubation    Additional Comments  Atraumatic ETT placement, dentition unchanged.

## 2023-12-12 LAB
ACID FAST STN SPEC: NEGATIVE
SPECIMEN PREPARATION: NORMAL

## 2023-12-13 LAB
BACTERIA SPEC AEROBE CULT: NO GROWTH
GRAM STN SPEC: NORMAL
GRAM STN SPEC: NORMAL
REF LAB TEST METHOD: NORMAL

## 2023-12-14 LAB
FUNGUS SPEC CULT: NORMAL
FUNGUS SPEC FUNGUS STN: NORMAL
REF LAB TEST METHOD: NORMAL

## 2023-12-21 ENCOUNTER — PATIENT OUTREACH (OUTPATIENT)
Dept: PULMONOLOGY | Facility: CLINIC | Age: 63
End: 2023-12-21
Payer: MEDICARE

## 2023-12-21 NOTE — SIGNIFICANT NOTE
Call received from patient's daughter, Robyn, to [facility], asking about results of patient's bronchoscopy on 12/11.  Confirmed with Dr. Salazar, all results to this point have resulted as negative, however some cultures are only in their preliminary result, and if they show anything positive in the coming weeks to months, patient will be notified and appropriate treatment started.  Dr. Salazar recommends for patient to follow up in OV in ~3 months, at which time further testing and/or imaging will be determined.  All information communicated to patient's daughter, and all questions answered to her satisfaction.  Follow up appt made for patient for Mon, 3/25 at 10:00am w/ Dr. Salazar in pulm clinic.  Appt reminder to be mailed to patient.  [facility] encouraged for patient/daughter to call if patient has any needs or concerns prior to the March appt.  Daughter verbalized understanding and is in agreement with patient's plan of care.

## 2024-01-09 LAB
FUNGUS SPEC CULT: NORMAL
FUNGUS SPEC FUNGUS STN: NORMAL

## 2024-01-27 LAB
ACID FAST STN SPEC: NEGATIVE
MYCOBACTERIUM SPEC QL CULT: NEGATIVE
SPECIMEN PREPARATION: NORMAL

## 2024-03-28 ENCOUNTER — TELEPHONE (OUTPATIENT)
Dept: PULMONOLOGY | Facility: CLINIC | Age: 64
End: 2024-03-28

## 2024-03-28 NOTE — TELEPHONE ENCOUNTER
Provider: MARY DAISY THOMPSON    Caller: SIMEON     Phone Number: [phone]     Reason for Call: SIMEON FROM [facility] CALLED TO GET THE PTS LAST OFFICE NOTE AND MEDICAL RECORDS INCLUDING THE BIOPSY THAT HE HAD DONE.     When was the patient last seen: 02/26/23

## 2024-04-01 ENCOUNTER — TELEPHONE (OUTPATIENT)
Dept: PULMONOLOGY | Facility: CLINIC | Age: 64
End: 2024-04-01
Payer: MEDICARE

## (undated) DEVICE — SINGLE USE SUCTION VALVE MAJ-209: Brand: SINGLE USE SUCTION VALVE (STERILE)

## (undated) DEVICE — VLV SXN BRONCH DISP FOR FLEX ENDOSCOPE

## (undated) DEVICE — BOWL UTIL STRL 32OZ

## (undated) DEVICE — BRUSH CYTO BRONCOSCOPE

## (undated) DEVICE — CATHETER: Brand: ION

## (undated) DEVICE — SWIVEL CONNECTOR

## (undated) DEVICE — SOL IRR NACL 0.9PCT 1000ML

## (undated) DEVICE — SYR SLPTP 20CC

## (undated) DEVICE — Device: Brand: ION

## (undated) DEVICE — VISION PROBE ADAPTER AND SUCTION ADAPTER

## (undated) DEVICE — CATHETER GUIDE

## (undated) DEVICE — BIOPSY NEEDLE, 19G: Brand: FLEXISION

## (undated) DEVICE — SENSR O2 OXIMAX FNGR A/ 18IN NONSTR

## (undated) DEVICE — VISION PROBE: Brand: ION

## (undated) DEVICE — TRAP SPECI MUCUS 40CC LF STRL